# Patient Record
Sex: FEMALE | Race: WHITE | Employment: FULL TIME | URBAN - NONMETROPOLITAN AREA
[De-identification: names, ages, dates, MRNs, and addresses within clinical notes are randomized per-mention and may not be internally consistent; named-entity substitution may affect disease eponyms.]

---

## 2019-12-09 ENCOUNTER — HOSPITAL ENCOUNTER (OUTPATIENT)
Dept: PHYSICAL THERAPY | Age: 69
Setting detail: THERAPIES SERIES
Discharge: HOME | End: 2019-12-09
Payer: COMMERCIAL

## 2019-12-09 PROCEDURE — 97162 PT EVAL MOD COMPLEX 30 MIN: CPT

## 2019-12-09 PROCEDURE — 97110 THERAPEUTIC EXERCISES: CPT

## 2019-12-09 ASSESSMENT — PAIN DESCRIPTION - ORIENTATION: ORIENTATION: LEFT

## 2019-12-09 ASSESSMENT — PAIN SCALES - GENERAL: PAINLEVEL_OUTOF10: 4

## 2019-12-09 ASSESSMENT — PAIN DESCRIPTION - LOCATION: LOCATION: KNEE;BACK;LEG

## 2019-12-11 ENCOUNTER — HOSPITAL ENCOUNTER (OUTPATIENT)
Dept: PHYSICAL THERAPY | Age: 69
Setting detail: THERAPIES SERIES
Discharge: HOME | End: 2019-12-11
Payer: COMMERCIAL

## 2019-12-11 PROCEDURE — 97110 THERAPEUTIC EXERCISES: CPT

## 2019-12-16 ENCOUNTER — HOSPITAL ENCOUNTER (OUTPATIENT)
Dept: PHYSICAL THERAPY | Age: 69
Setting detail: THERAPIES SERIES
Discharge: HOME | End: 2019-12-16
Payer: COMMERCIAL

## 2019-12-16 PROCEDURE — 97110 THERAPEUTIC EXERCISES: CPT

## 2019-12-16 PROCEDURE — 97140 MANUAL THERAPY 1/> REGIONS: CPT

## 2019-12-16 ASSESSMENT — PAIN DESCRIPTION - ORIENTATION: ORIENTATION: LEFT

## 2019-12-16 ASSESSMENT — PAIN DESCRIPTION - LOCATION: LOCATION: KNEE

## 2019-12-16 ASSESSMENT — PAIN SCALES - GENERAL: PAINLEVEL_OUTOF10: 2

## 2019-12-20 ENCOUNTER — HOSPITAL ENCOUNTER (OUTPATIENT)
Dept: PHYSICAL THERAPY | Age: 69
Setting detail: THERAPIES SERIES
Discharge: HOME | End: 2019-12-20
Payer: COMMERCIAL

## 2019-12-20 PROCEDURE — 97110 THERAPEUTIC EXERCISES: CPT

## 2019-12-20 ASSESSMENT — PAIN SCALES - GENERAL: PAINLEVEL_OUTOF10: 2

## 2019-12-23 ENCOUNTER — HOSPITAL ENCOUNTER (OUTPATIENT)
Dept: PHYSICAL THERAPY | Age: 69
Setting detail: THERAPIES SERIES
Discharge: HOME | End: 2019-12-23
Payer: COMMERCIAL

## 2019-12-23 PROCEDURE — 97140 MANUAL THERAPY 1/> REGIONS: CPT

## 2019-12-23 PROCEDURE — 97110 THERAPEUTIC EXERCISES: CPT

## 2019-12-23 ASSESSMENT — PAIN DESCRIPTION - ORIENTATION: ORIENTATION: LEFT

## 2019-12-23 ASSESSMENT — PAIN DESCRIPTION - LOCATION: LOCATION: KNEE

## 2019-12-23 ASSESSMENT — PAIN SCALES - GENERAL: PAINLEVEL_OUTOF10: 3

## 2019-12-27 ENCOUNTER — APPOINTMENT (OUTPATIENT)
Dept: PHYSICAL THERAPY | Age: 69
End: 2019-12-27
Payer: COMMERCIAL

## 2019-12-31 ENCOUNTER — HOSPITAL ENCOUNTER (OUTPATIENT)
Dept: PHYSICAL THERAPY | Age: 69
Setting detail: THERAPIES SERIES
Discharge: HOME | End: 2019-12-31
Payer: COMMERCIAL

## 2019-12-31 PROCEDURE — 97110 THERAPEUTIC EXERCISES: CPT

## 2019-12-31 ASSESSMENT — PAIN SCALES - GENERAL: PAINLEVEL_OUTOF10: 3

## 2020-01-03 ENCOUNTER — HOSPITAL ENCOUNTER (OUTPATIENT)
Dept: PHYSICAL THERAPY | Age: 70
Setting detail: THERAPIES SERIES
Discharge: HOME | End: 2020-01-03
Payer: COMMERCIAL

## 2020-01-03 PROCEDURE — 97110 THERAPEUTIC EXERCISES: CPT

## 2020-01-03 ASSESSMENT — PAIN SCALES - GENERAL: PAINLEVEL_OUTOF10: 3

## 2020-01-06 ENCOUNTER — HOSPITAL ENCOUNTER (OUTPATIENT)
Dept: PHYSICAL THERAPY | Age: 70
Setting detail: THERAPIES SERIES
Discharge: HOME | End: 2020-01-06
Payer: COMMERCIAL

## 2020-01-06 PROCEDURE — 97110 THERAPEUTIC EXERCISES: CPT

## 2020-01-06 PROCEDURE — 97140 MANUAL THERAPY 1/> REGIONS: CPT

## 2020-01-06 ASSESSMENT — PAIN DESCRIPTION - ORIENTATION: ORIENTATION: LEFT

## 2020-01-06 ASSESSMENT — PAIN SCALES - GENERAL: PAINLEVEL_OUTOF10: 2

## 2020-01-06 ASSESSMENT — PAIN DESCRIPTION - LOCATION: LOCATION: KNEE

## 2020-01-10 ENCOUNTER — APPOINTMENT (OUTPATIENT)
Dept: PHYSICAL THERAPY | Age: 70
End: 2020-01-10
Payer: COMMERCIAL

## 2020-01-13 ENCOUNTER — HOSPITAL ENCOUNTER (OUTPATIENT)
Dept: PHYSICAL THERAPY | Age: 70
Setting detail: THERAPIES SERIES
Discharge: HOME | End: 2020-01-13
Payer: COMMERCIAL

## 2020-01-13 PROCEDURE — 97110 THERAPEUTIC EXERCISES: CPT

## 2020-01-13 ASSESSMENT — PAIN SCALES - GENERAL: PAINLEVEL_OUTOF10: 2

## 2020-01-13 NOTE — PROGRESS NOTES
Phone: Geraldo Spears      Fax: [phone]                            Outpatient Physical Therapy                                                                            Daily Note    Date: 2020  Patient Name: Yousuf Pate        MRN: [id number]   ACCT#:  [de-identified]  : 1950  (81 y.o.)    Referring Practitioner: Dr. Vladimir Urena    Referral Date : 19    Diagnosis: S/P Left TKA  Treatment Diagnosis: Left knee pain    Onset Date: 19  PT Insurance Information: BCBS  Total # of Visits Approved: 18 Per Physician Order  Total # of Visits to Date: 9  No Show: 0  Canceled Appointment: 0  Plan of Care/Certification Expiration Date: 20    Pre-Treatment Pain:  2/10     Assessment  Assessment: Pt reports knee is not painful, just weak. She conts with sciatic symptoms. Dr prescribed prednisone. PROM  left knee flexion 121 deg, AROM 115 deg flexion. Pt notes 1 leg feels longer than the other, may assess. Chart Reviewed: Yes    Plan  Plan: Continue with current plan    Exercises/Modalities/Manual:  See DocFlow Sheet       Goals  (Total # of Visits to Date: 5)   Short Term Goals - Time Frame for Short term goals: 9  Short term goal 1: Increase PROM left knee flexion 110 degrees to alternate feet on stairs-MET  Short term goal 2: Increase AROM left knee extension 0 degrees to walk with heel to toe pattern with even strides-met             Long Term Goals - Time Frame for Long term goals : 25  Long term goal 1: Gait WFL without device-Met  Long term goal 2:  Increase strength Right knee extension 4+/5 to squat with good form  Long term goal 3: Improve functional mobility with LEFS score >50/80          Post Treatment Pain:  210    Time In: 1325    Time Out : 1405        Timed Code Treatment Minutes: 40 Minutes  Total Treatment Time: 40 Minutes    Sander Avila  PTA   Date: 2020

## 2020-01-16 ENCOUNTER — HOSPITAL ENCOUNTER (OUTPATIENT)
Dept: PHYSICAL THERAPY | Age: 70
Setting detail: THERAPIES SERIES
Discharge: HOME | End: 2020-01-16
Payer: COMMERCIAL

## 2020-01-16 PROCEDURE — 97110 THERAPEUTIC EXERCISES: CPT

## 2020-01-16 PROCEDURE — 97140 MANUAL THERAPY 1/> REGIONS: CPT

## 2020-01-16 ASSESSMENT — PAIN DESCRIPTION - ORIENTATION: ORIENTATION: LEFT

## 2020-01-16 ASSESSMENT — PAIN SCALES - GENERAL: PAINLEVEL_OUTOF10: 2

## 2020-01-16 ASSESSMENT — PAIN DESCRIPTION - LOCATION: LOCATION: KNEE

## 2020-01-16 NOTE — PROGRESS NOTES
Phone: Geraldo Spears      Fax: [phone]                            Outpatient Physical Therapy                                                                            Daily Note    Date: 2020  Patient Name: Chelo Chau        MRN: [id number]   ACCT#:  [de-identified]  : 1950  (81 y.o.)    Referring Practitioner: Dr. Filippo Castillo    Referral Date : 19    Diagnosis: S/P Left TKA  Treatment Diagnosis: Left knee pain    Onset Date: 19  PT Insurance Information: BCBS  Total # of Visits Approved: 18 Per Physician Order  Total # of Visits to Date: 10  Plan of Care/Certification Expiration Date: 02/10/20    Pre-Treatment Pain:  2/10     Assessment  Assessment: Pain 2/0 left knee. PROM left knee 0-122 degrees. Strength right knee extension 4/5, increase resistence and reps on Shuttle. Plan to work on free squats and squat and lift crate with good form next visit. Chart Reviewed: Yes    Plan  Plan: Continue with current plan    Exercises/Modalities/Manual:  See DocFlow Sheet    Education:         Goals  (Total # of Visits to Date: 10)   Short Term Goals - Time Frame for Short term goals: 9  Short term goal 1: Increase PROM left knee flexion 110 degrees to alternate feet on stairs-MET  Short term goal 2: Increase AROM left knee extension 0 degrees to walk with heel to toe pattern with even strides-met             Long Term Goals - Time Frame for Long term goals : 25  Long term goal 1: Gait WFL without device-Met  Long term goal 2:  Increase strength Right knee extension 4+/5 to squat with good form  Long term goal 3: Improve functional mobility with LEFS score >50/80          Post Treatment Pain:  10    Time In: 13:00    Time Out : 13:40        Timed Code Treatment Minutes: 40 Minutes  Total Treatment Time: 2 Bela Saavedra PT     Date: 2020

## 2020-01-20 ENCOUNTER — APPOINTMENT (OUTPATIENT)
Dept: PHYSICAL THERAPY | Age: 70
End: 2020-01-20
Payer: COMMERCIAL

## 2020-01-22 ENCOUNTER — APPOINTMENT (OUTPATIENT)
Dept: PHYSICAL THERAPY | Age: 70
End: 2020-01-22
Payer: COMMERCIAL

## 2020-01-23 ENCOUNTER — HOSPITAL ENCOUNTER (OUTPATIENT)
Dept: PHYSICAL THERAPY | Age: 70
Setting detail: THERAPIES SERIES
Discharge: HOME | End: 2020-01-23
Payer: COMMERCIAL

## 2020-01-23 PROCEDURE — 97110 THERAPEUTIC EXERCISES: CPT

## 2020-01-23 ASSESSMENT — PAIN SCALES - GENERAL: PAINLEVEL_OUTOF10: 2

## 2020-01-23 NOTE — PROGRESS NOTES
Phone: Geraldo Spears      Fax: [phone]                            Outpatient Physical Therapy                                                                            Daily Note    Date: 2020  Patient Name: Chelo Chau        MRN: [id number]   ACCT#:  [de-identified]  : 1950  (81 y.o.)    Referring Practitioner: Dr. Filippo Castillo    Referral Date : 19    Diagnosis: S/P Left TKA  Treatment Diagnosis: Left knee pain    Onset Date: 19  PT Insurance Information: BCBS  Total # of Visits Approved: 18 Per Physician Order  Total # of Visits to Date: 11  No Show: 0  Canceled Appointment: 0  Plan of Care/Certification Expiration Date: 20    Pre-Treatment Pain: 2-3/10     Assessment  Assessment: Patient reports knee is stiff today and rates pain 2-3/10. Pain primarily in the back of knee on the lateral aspect. Continued with exercises as outlined above. PROM L knee 125 degrees. Iniated crate lifts and free squats to work on proper techinque. Patient needs cueing to complete properly. Chart Reviewed: Yes    Plan  Plan: Continue with current plan    Exercises/Modalities/Manual:  See DocFlow Sheet    Education:           Goals  (Total # of Visits to Date: 6)   Short Term Goals - Time Frame for Short term goals: 9  Short term goal 1: Increase PROM left knee flexion 110 degrees to alternate feet on stairs-MET  Short term goal 2: Increase AROM left knee extension 0 degrees to walk with heel to toe pattern with even strides-met             Long Term Goals - Time Frame for Long term goals : 25  Long term goal 1: Gait WFL without device-Met  Long term goal 2:  Increase strength Right knee extension 4+/5 to squat with good form  Long term goal 3: Improve functional mobility with LEFS score >50/80          Post Treatment Pain:  2-3/10    Time In: 1255   Time Out : 1333        Timed Code Treatment Minutes: 38 Minutes  Total Treatment Time: [address] [place] ,PTA    Date: 1/23/2020

## 2020-01-29 ENCOUNTER — HOSPITAL ENCOUNTER (OUTPATIENT)
Dept: PHYSICAL THERAPY | Age: 70
Setting detail: THERAPIES SERIES
Discharge: HOME | End: 2020-01-29
Payer: COMMERCIAL

## 2020-01-29 PROCEDURE — 97110 THERAPEUTIC EXERCISES: CPT

## 2020-01-29 PROCEDURE — 97140 MANUAL THERAPY 1/> REGIONS: CPT

## 2020-01-29 ASSESSMENT — PAIN DESCRIPTION - ORIENTATION: ORIENTATION: LEFT

## 2020-01-29 ASSESSMENT — PAIN DESCRIPTION - LOCATION: LOCATION: KNEE

## 2020-01-29 ASSESSMENT — PAIN SCALES - GENERAL: PAINLEVEL_OUTOF10: 3

## 2020-01-29 NOTE — PROGRESS NOTES
Phone: Geraldo Spears      Fax: [phone]                            Outpatient Physical Therapy                                                                            Daily Note    Date: 2020  Patient Name: Karthik Galvin        MRN: [id number]   ACCT#:  [de-identified]  : 1950  (81 y.o.)    Referring Practitioner: Dr. Rojelio Vincent    Referral Date : 19    Diagnosis: S/P Left TKA  Treatment Diagnosis: Left knee pain    Onset Date: 19  PT Insurance Information: BCBS  Total # of Visits Approved: 18 Per Physician Order  Total # of Visits to Date: 12  Plan of Care/Certification Expiration Date: 20    Pre-Treatment Pain:  3/10     Assessment  Assessment: Patient c/o increase pain 6/10 for several days after last session, which she attributes to over doing the squats. held squats this date per patient request. Pain 3/10 left knee, mostly posterior knee/ lateral hamstring tendon. Manual therapy to hamstring tendon and stretching hamstring in supine added. Anticipate discharge in 2 visits. Chart Reviewed: Yes    Plan  Plan: Continue with current plan    Exercises/Modalities/Manual:  See DocFlow Sheet    Education:           Goals  (Total # of Visits to Date: 15)   Short Term Goals - Time Frame for Short term goals: 9  Short term goal 1: Increase PROM left knee flexion 110 degrees to alternate feet on stairs-MET  Short term goal 2: Increase AROM left knee extension 0 degrees to walk with heel to toe pattern with even strides-met             Long Term Goals - Time Frame for Long term goals : 25  Long term goal 1: Gait WFL without device-Met  Long term goal 2:  Increase strength Right knee extension 4+/5 to squat with good form  Long term goal 3: Improve functional mobility with LEFS score >50/80          Post Treatment Pain:  2/10    Time In: 10;30    Time Out : 11;15        Timed Code Treatment Minutes: 45 Minutes  Total Treatment Time: 45 113 Ashley Saavedra, PT     Date: 1/29/2020

## 2020-02-03 ENCOUNTER — HOSPITAL ENCOUNTER (OUTPATIENT)
Dept: PHYSICAL THERAPY | Age: 70
Setting detail: THERAPIES SERIES
Discharge: HOME | End: 2020-02-03
Payer: COMMERCIAL

## 2020-02-03 PROCEDURE — 97110 THERAPEUTIC EXERCISES: CPT

## 2020-02-03 NOTE — PROGRESS NOTES
Phone: [address]      Fax: [phone]                            Outpatient Physical Therapy                                                                            Daily Note    Date: 2/3/2020  Patient Name: Karthik Galvin        MRN: [id number]   ACCT#:  [de-identified]  : 1950  (81 y.o.)    Referring Practitioner: Dr. Carmen Guevara    Referral Date : 19    Diagnosis: S/P Left TKA  Treatment Diagnosis: Left knee pain    Onset Date: 19  PT Insurance Information: BCBS  Total # of Visits Approved: 18 Per Physician Order  Total # of Visits to Date: 13  No Show: 0  Canceled Appointment: 0  Plan of Care/Certification Expiration Date: 20    Pre-Treatment Pain:  3/10     Assessment  Assessment: Pt reports  Less pain after last session holding squats. PROM  to 120 deg in supine. 1230 [facility] visit. Pt issued LEFS. Chart Reviewed: Yes    Plan  Plan: Continue with current plan    Exercises/Modalities/Manual:  See DocFlow Sheet            Goals  (Total # of Visits to Date: 15)   Short Term Goals - Time Frame for Short term goals: 9  Short term goal 1: Increase PROM left knee flexion 110 degrees to alternate feet on stairs-MET  Short term goal 2: Increase AROM left knee extension 0 degrees to walk with heel to toe pattern with even strides-met             Long Term Goals - Time Frame for Long term goals : 25  Long term goal 1: Gait WFL without device-Met  Long term goal 2:  Increase strength Right knee extension 4+/5 to squat with good form  Long term goal 3: Improve functional mobility with LEFS score >50/80          Post Treatment Pain:  3/10    Time In: 0945    Time Out : 1025        Timed Code Treatment Minutes: 40 Minutes  Total Treatment Time: 40 Minutes    Miguel Avila PTA Date: 2/3/2020

## 2020-02-05 ENCOUNTER — HOSPITAL ENCOUNTER (OUTPATIENT)
Dept: PHYSICAL THERAPY | Age: 70
Setting detail: THERAPIES SERIES
Discharge: HOME | End: 2020-02-05
Payer: COMMERCIAL

## 2020-02-05 PROCEDURE — 97140 MANUAL THERAPY 1/> REGIONS: CPT

## 2020-02-05 PROCEDURE — 97110 THERAPEUTIC EXERCISES: CPT

## 2020-02-05 ASSESSMENT — PAIN DESCRIPTION - LOCATION: LOCATION: KNEE

## 2020-02-05 ASSESSMENT — PAIN DESCRIPTION - ORIENTATION: ORIENTATION: LEFT

## 2020-02-05 ASSESSMENT — PAIN SCALES - GENERAL: PAINLEVEL_OUTOF10: 2

## 2020-02-05 NOTE — DISCHARGE SUMMARY
Phone: [address] Spears             Fax: [phone]                            Outpatient Physical Therapy                                                                    Discharge Summary    Patient: Sandip Ayala  : 1950  ACCT #: [de-identified]   Referring Practitioner: Dr. Zeferino Gunn      Diagnosis: S/P Left TKA  Date Treatment Initiated: 2019  Date of Last Treatment: 20    PT Visit Information  Onset Date: 19  PT Insurance Information: BCBS  Total # of Visits Approved: 18  Total # of Visits to Date: 14    Frequency/Duration  Days: 2- 3 times per week  Weeks: 6 weeks    Treatment Received  Patient Education/HEP, Therapeutic Exercise, Manual Therapy: Myofacial Release/Cupping and Gait Training    Pain Level: 2    Assessment  Assessment: Pain 2/10 left knee. ROM left knee 0-120 degrees. Strength left knee ext 4+/5, able to squat with good form. LEFS score 50/80. Ambulation 1  Device: No Device  Quality of Gait: WFL    Reason for Discharge  Goals Met    Comments:   Thank you for this referral      Ector Souza  Date: 2020

## 2020-02-05 NOTE — PROGRESS NOTES
Phone: Geraldo Molina Regan      Fax: [phone]                            Outpatient Physical Therapy                                                                            Daily Note    Date: 2020  Patient Name: Mitzi Spring        MRN: [id number]   ACCT#:  [de-identified]  : 1950  (81 y.o.)    Referring Practitioner: Dr. Giancarlo Perez    Referral Date : 19    Diagnosis: S/P Left TKA  Treatment Diagnosis: Left knee pain    Onset Date: 19  PT Insurance Information: BCBS  Total # of Visits Approved: 18 Per Physician Order  Total # of Visits to Date: 14  Plan of Care/Certification Expiration Date: 20    Pre-Treatment Pain:  2/10     Assessment  Assessment: Pain 2/10 left knee. ROM left knee 0-120 degrees. Strength left knee ext 4+/5, able to squat with good form. LEFS score 50/80. Chart Reviewed: Yes    Plan  Plan: Discharge    Exercises/Modalities/Manual:  See DocFlow Sheet    Education:           Goals  (Total # of Visits to Date: 15)   Short Term Goals - Time Frame for Short term goals: 9  Short term goal 1: Increase PROM left knee flexion 110 degrees to alternate feet on stairs-MET  Short term goal 2: Increase AROM left knee extension 0 degrees to walk with heel to toe pattern with even strides-met             Long Term Goals - Time Frame for Long term goals : 25  Long term goal 1: Gait WFL without device-Met  Long term goal 2:  Increase strength Right knee extension 4+/5 to squat with good form-MET  Long term goal 3: Improve functional mobility with LEFS score >50/80-MET          Post Treatment Pain:  210    Time In: 11:15    Time Out : 11:55        Timed Code Treatment Minutes: 40 Minutes  Total Treatment Time:  Bela Saavedra PT     Date: 2020

## 2020-02-06 ENCOUNTER — APPOINTMENT (OUTPATIENT)
Dept: PHYSICAL THERAPY | Age: 70
End: 2020-02-06
Payer: COMMERCIAL